# Patient Record
Sex: FEMALE | URBAN - METROPOLITAN AREA
[De-identification: names, ages, dates, MRNs, and addresses within clinical notes are randomized per-mention and may not be internally consistent; named-entity substitution may affect disease eponyms.]

---

## 2020-12-18 ENCOUNTER — OFFICE VISIT (OUTPATIENT)
Dept: URBAN - METROPOLITAN AREA CLINIC 84 | Facility: CLINIC | Age: 65
End: 2020-12-18
Payer: MEDICARE

## 2020-12-18 DIAGNOSIS — R94.5 ABNORMAL LFTS: ICD-10-CM

## 2020-12-18 PROCEDURE — 99213 OFFICE O/P EST LOW 20 MIN: CPT | Performed by: INTERNAL MEDICINE

## 2020-12-18 PROCEDURE — 3017F COLORECTAL CA SCREEN DOC REV: CPT | Performed by: INTERNAL MEDICINE

## 2020-12-18 PROCEDURE — G9903 PT SCRN TBCO ID AS NON USER: HCPCS | Performed by: INTERNAL MEDICINE

## 2020-12-18 PROCEDURE — G8427 DOCREV CUR MEDS BY ELIG CLIN: HCPCS | Performed by: INTERNAL MEDICINE

## 2020-12-18 NOTE — HPI-TODAY'S VISIT:
64 yo WF who is referred for evaluation of abnormal LFT. Outside labs from November showed an ALT 81, AST 50. Previous labs within the past year showed normal LFT. Her bilirubin was 1.6 (Direct only 0.3).  Reports she was started on Zetia in July. Also takes Crestor but has been taking this for several years.  Does not drink alcohol. She has CAD, Dyslipidemia or hypertension. Had an US 2 weeks ago and reports this was normal.  Reports she is up to date on her colon cancer screening.

## 2020-12-27 LAB
ACTIN (SMOOTH MUSCLE) ANTIBODY: 3
ALBUMIN: 4.7
ALKALINE PHOSPHATASE: 100
ALPHA-1-ANTITRYPSIN, SERUM: 140
ALT (SGPT): 77
ANA DIRECT: NEGATIVE
AST (SGOT): 57
BILIRUBIN, DIRECT: 0.32
BILIRUBIN, TOTAL: 1.6
COMMENT:: (no result)
FERRITIN, SERUM: 128
HBSAG SCREEN: NEGATIVE
HCV AB: <0.1
HEP B CORE AB, IGM: NEGATIVE
HEP B CORE AB, TOT: NEGATIVE
HEP B SURFACE AB, QUAL: NON REACTIVE
IGG, IMMUNOGLOBULIN G (RDL): 882
IRON BIND.CAP.(TIBC): 358
IRON SATURATION: 21
IRON: 74
MITOCHONDRIAL (M2) ANTIBODY: <20
PHENOTYPE (PI): (no result)
PROTEIN, TOTAL: 7.1
UIBC: 284

## 2021-01-22 ENCOUNTER — OFFICE VISIT (OUTPATIENT)
Dept: URBAN - METROPOLITAN AREA CLINIC 13 | Facility: CLINIC | Age: 66
End: 2021-01-22

## 2021-01-22 PROBLEM — 38341003 HYPERTENSION: Status: ACTIVE | Noted: 2021-01-22

## 2021-02-03 ENCOUNTER — OFFICE VISIT (OUTPATIENT)
Dept: URBAN - METROPOLITAN AREA TELEHEALTH 2 | Facility: TELEHEALTH | Age: 66
End: 2021-02-03
Payer: MEDICARE

## 2021-02-03 DIAGNOSIS — Z79.899 HIGH RISK MEDICATION USE: ICD-10-CM

## 2021-02-03 DIAGNOSIS — E78.5 HYPERLIPIDEMIA: ICD-10-CM

## 2021-02-03 DIAGNOSIS — Z71.89 VACCINE COUNSELING: ICD-10-CM

## 2021-02-03 DIAGNOSIS — R74.8 ABNORMAL LIVER ENZYMES: ICD-10-CM

## 2021-02-03 PROCEDURE — 99442 PHONE E/M BY PHYS 11-20 MIN: CPT

## 2021-02-03 PROCEDURE — 99203 OFFICE O/P NEW LOW 30 MIN: CPT

## 2021-02-03 NOTE — HPI-TODAY'S VISIT:
66 yo WF who is referred for evaluation of abnormal LFT and saw dr. buenrostro last month.    Outside labs from November showed an ALT 81, AST 50. Previous labs within the past year showed normal LFT. Her bilirubin was 1.6 (Direct only 0.3).  she had screens done that came back negative.  Reports she was started on Zetia in July. Also takes Crestor but has been taking this for several years .  Does not drink alcohol. She has CAD, Dyslipidemia or hypertension. liver u/s in dec 2020 was normal. she is on boneup-[email or web address]&utm_medium=product_sync&utm_source=[employer]&utm_content=sag_organic&utm_campaign=sag_organic&[email or web address]

## 2021-02-17 LAB
ALBUMIN: 4.5
ALKALINE PHOSPHATASE: 115
ALT (SGPT): 55
AST (SGOT): 41
BILIRUBIN, DIRECT: 0.3
BILIRUBIN, TOTAL: 1.3
HEP A AB, TOTAL: NEGATIVE
HEPATITIS B SURF AB QUANT: <3.1
PROTEIN, TOTAL: 6.8

## 2021-02-19 PROBLEM — 13644009 HYPERCHOLESTEROLEMIA: Status: ACTIVE | Noted: 2021-02-19

## 2021-02-22 ENCOUNTER — OFFICE VISIT (OUTPATIENT)
Dept: URBAN - METROPOLITAN AREA SURGERY CENTER 28 | Facility: SURGERY CENTER | Age: 66
End: 2021-02-22

## 2021-02-22 PROBLEM — 428283002 HISTORY OF POLYP OF COLON: Status: ACTIVE | Noted: 2021-02-22

## 2021-02-22 PROBLEM — 3723001 ARTHRITIS: Status: ACTIVE | Noted: 2021-02-22

## 2021-02-22 LAB — PDFREPORT1: (no result)

## 2021-02-27 ENCOUNTER — LAB OUTSIDE AN ENCOUNTER (OUTPATIENT)
Dept: URBAN - METROPOLITAN AREA CLINIC 13 | Facility: CLINIC | Age: 66
End: 2021-02-27

## 2021-03-05 ENCOUNTER — OFFICE VISIT (OUTPATIENT)
Dept: URBAN - METROPOLITAN AREA CLINIC 13 | Facility: CLINIC | Age: 66
End: 2021-03-05

## 2021-03-17 ENCOUNTER — LAB OUTSIDE AN ENCOUNTER (OUTPATIENT)
Dept: URBAN - METROPOLITAN AREA TELEHEALTH 2 | Facility: TELEHEALTH | Age: 66
End: 2021-03-17

## 2021-03-24 ENCOUNTER — OFFICE VISIT (OUTPATIENT)
Dept: URBAN - METROPOLITAN AREA TELEHEALTH 2 | Facility: TELEHEALTH | Age: 66
End: 2021-03-24
Payer: MEDICARE

## 2021-03-24 DIAGNOSIS — R94.5 ABNORMAL LFTS: ICD-10-CM

## 2021-03-24 DIAGNOSIS — Z79.899 HIGH RISK MEDICATION USE: ICD-10-CM

## 2021-03-24 DIAGNOSIS — E78.5 HYPERLIPIDEMIA: ICD-10-CM

## 2021-03-24 DIAGNOSIS — Z71.89 VACCINE COUNSELING: ICD-10-CM

## 2021-03-24 DIAGNOSIS — R17 ELEVATED BILIRUBIN: ICD-10-CM

## 2021-03-24 PROCEDURE — 99442 PHONE E/M BY PHYS 11-20 MIN: CPT | Performed by: PHYSICIAN ASSISTANT

## 2021-03-24 NOTE — HPI-TODAY'S VISIT:
66 yo WF who is referred for evaluation of abnormal LFT and did telehealth visit.  no changes in meds were made and her lfts were improving with labs on march 3rd, but then bumped with recent labs and pt states she is having more beer when the weather is nicer outside. advised her to avoid this as did her pcp and they will check her lfs in 2 weeks and we will reassess in 4 weeks.  This was only change.   3/3/21 labs:  ast 26 alt 38   3/17/21 labs  Tb 1.4 Alp 86 Ast 53 Alt 75  RECAP:   Outside labs from November showed an ALT 81, AST 50. Previous labs within the past year showed normal LFT. Her bilirubin was 1.6 (Direct only 0.3).  she had screens done that came back negative.  Reports she was started on Zetia in July. Also takes Crestor but has been taking this for several years .  Does not drink alcohol. She has CAD, Dyslipidemia or hypertension. liver u/s in dec 2020 was normal. she is on boneup-[email or web address]=USDandutm_medium=product_syncandutm_source=[employer]_content=sag_organicandutm_campaign=sag_organicandgclid=[id number]

## 2021-04-21 ENCOUNTER — OFFICE VISIT (OUTPATIENT)
Dept: URBAN - METROPOLITAN AREA TELEHEALTH 2 | Facility: TELEHEALTH | Age: 66
End: 2021-04-21
Payer: MEDICARE

## 2021-04-21 DIAGNOSIS — E78.5 HYPERLIPIDEMIA: ICD-10-CM

## 2021-04-21 DIAGNOSIS — Z79.899 HIGH RISK MEDICATION USE: ICD-10-CM

## 2021-04-21 DIAGNOSIS — R79.89 ABNORMAL LIVER FUNCTION TEST: ICD-10-CM

## 2021-04-21 DIAGNOSIS — R17 ELEVATED BILIRUBIN: ICD-10-CM

## 2021-04-21 DIAGNOSIS — Z71.89 VACCINE COUNSELING: ICD-10-CM

## 2021-04-21 DIAGNOSIS — R94.5 ABNORMAL LFTS: ICD-10-CM

## 2021-04-21 PROBLEM — 26165005: Status: ACTIVE | Noted: 2021-03-23

## 2021-04-21 PROBLEM — 166603001 LIVER FUNCTION TESTS ABNORMAL: Status: ACTIVE | Noted: 2021-02-02

## 2021-04-21 PROBLEM — 55822004 HYPERLIPIDEMIA: Status: ACTIVE | Noted: 2021-02-02

## 2021-04-21 PROBLEM — 161646004 H/O: HIGH RISK MEDICATION: Status: ACTIVE | Noted: 2021-02-03

## 2021-04-21 PROBLEM — 409063005 COUNSELING: Status: ACTIVE | Noted: 2021-02-02

## 2021-04-21 PROCEDURE — 99442 PHONE E/M BY PHYS 11-20 MIN: CPT | Performed by: PHYSICIAN ASSISTANT

## 2021-06-23 ENCOUNTER — DASHBOARD ENCOUNTERS (OUTPATIENT)
Age: 66
End: 2021-06-23

## 2021-07-07 ENCOUNTER — OFFICE VISIT (OUTPATIENT)
Dept: URBAN - METROPOLITAN AREA TELEHEALTH 2 | Facility: TELEHEALTH | Age: 66
End: 2021-07-07

## 2021-07-20 ENCOUNTER — OFFICE VISIT (OUTPATIENT)
Dept: URBAN - METROPOLITAN AREA TELEHEALTH 2 | Facility: TELEHEALTH | Age: 66
End: 2021-07-20

## 2021-08-28 ENCOUNTER — TELEPHONE ENCOUNTER (OUTPATIENT)
Dept: URBAN - METROPOLITAN AREA CLINIC 13 | Facility: CLINIC | Age: 66
End: 2021-08-28

## 2021-08-29 ENCOUNTER — TELEPHONE ENCOUNTER (OUTPATIENT)
Dept: URBAN - METROPOLITAN AREA CLINIC 13 | Facility: CLINIC | Age: 66
End: 2021-08-29

## 2021-08-29 RX ORDER — LOSARTAN POTASSIUM 50 MG/1
TABLET, FILM COATED ORAL
Status: ACTIVE | COMMUNITY

## 2021-08-29 RX ORDER — ATENOLOL 25 MG/1
TABLET ORAL
Status: ACTIVE | COMMUNITY

## 2021-08-29 RX ORDER — ASPIRIN 81 MG/1
TABLET, COATED ORAL
Status: ACTIVE | COMMUNITY

## 2021-08-29 RX ORDER — EZETIMIBE 10 MG/1
TABLET ORAL
Status: ACTIVE | COMMUNITY

## 2021-08-29 RX ORDER — ROSUVASTATIN CALCIUM 5 MG
TABLET ORAL
Status: ACTIVE | COMMUNITY
